# Patient Record
Sex: MALE | Race: WHITE | Employment: FULL TIME | URBAN - METROPOLITAN AREA
[De-identification: names, ages, dates, MRNs, and addresses within clinical notes are randomized per-mention and may not be internally consistent; named-entity substitution may affect disease eponyms.]

---

## 2022-03-18 ENCOUNTER — HOSPITAL ENCOUNTER (EMERGENCY)
Age: 37
Discharge: HOME | End: 2022-03-19
Attending: EMERGENCY MEDICINE
Payer: COMMERCIAL

## 2022-03-18 ENCOUNTER — APPOINTMENT (OUTPATIENT)
Dept: GENERAL RADIOLOGY | Age: 37
End: 2022-03-18
Attending: NURSE PRACTITIONER
Payer: COMMERCIAL

## 2022-03-18 VITALS
TEMPERATURE: 97.8 F | RESPIRATION RATE: 18 BRPM | SYSTOLIC BLOOD PRESSURE: 138 MMHG | HEART RATE: 64 BPM | OXYGEN SATURATION: 97 % | DIASTOLIC BLOOD PRESSURE: 79 MMHG

## 2022-03-18 DIAGNOSIS — S99.912A INJURY OF LEFT ANKLE, INITIAL ENCOUNTER: Primary | ICD-10-CM

## 2022-03-18 PROCEDURE — 99283 EMERGENCY DEPT VISIT LOW MDM: CPT

## 2022-03-18 PROCEDURE — 73610 X-RAY EXAM OF ANKLE: CPT

## 2022-03-18 PROCEDURE — 73590 X-RAY EXAM OF LOWER LEG: CPT

## 2022-03-19 NOTE — ED TRIAGE NOTES
TRIAGE NOTE:  Patient arrives ambulatory with c/o left ankle pain. Patient was playing soccer and reports heard a loud pop.

## 2022-03-19 NOTE — ED PROVIDER NOTES
HERMAN     Leida Lopez is a 39 y.o. male without any PMhx who presents ambulatory  to [facility] ED with cc of L leg pain. Patient reports that he was walking to play soccer tonight when he felt a pop in the back of his left leg. He states that he has been unable to flex and extend his ankle without pain and has tenderness to the back of his calf. No swelling present. He denies any falls or injuries. He denies any history of orthopedic surgeries recently. Denies ETOH, tobacco, substance abuse. No medication PTA for s/sx. PCP: Unknown, Provider, DPM    There are no other complaints, changes or physical findings at this time. No past medical history on file. No past surgical history on file. No family history on file. Social History     Socioeconomic History    Marital status: Not on file     Spouse name: Not on file    Number of children: Not on file    Years of education: Not on file    Highest education level: Not on file   Occupational History    Not on file   Tobacco Use    Smoking status: Not on file    Smokeless tobacco: Not on file   Substance and Sexual Activity    Alcohol use: Not on file    Drug use: Not on file    Sexual activity: Not on file   Other Topics Concern    Not on file   Social History Narrative    Not on file     Social Determinants of Health     Financial Resource Strain:     Difficulty of Paying Living Expenses: Not on file   Food Insecurity:     Worried About Running Out of Food in the Last Year: Not on file    Noemi of Food in the Last Year: Not on file   Transportation Needs:     Lack of Transportation (Medical): Not on file    Lack of Transportation (Non-Medical):  Not on file   Physical Activity:     Days of Exercise per Week: Not on file    Minutes of Exercise per Session: Not on file   Stress:     Feeling of Stress : Not on file   Social Connections:     Frequency of Communication with Friends and Family: Not on file    Frequency of Social Gatherings with Friends and Family: Not on file    Attends Hindu Services: Not on file    Active Member of Clubs or Organizations: Not on file    Attends Club or Organization Meetings: Not on file    Marital Status: Not on file   Intimate Partner Violence:     Fear of Current or Ex-Partner: Not on file    Emotionally Abused: Not on file    Physically Abused: Not on file    Sexually Abused: Not on file   Housing Stability:     Unable to Pay for Housing in the Last Year: Not on file    Number of [place] in the Last Year: Not on file    Unstable Housing in the Last Year: Not on file         ALLERGIES: Patient has no known allergies. Review of Systems   Constitutional: Negative for activity change, appetite change, chills and fever. HENT: Negative for congestion and sneezing. Eyes: Negative for visual disturbance. Respiratory: Negative for cough and shortness of breath. Cardiovascular: Negative for chest pain. Gastrointestinal: Negative for abdominal pain, diarrhea, nausea and vomiting. Genitourinary: Negative for dysuria, flank pain, frequency and urgency. Musculoskeletal: Positive for arthralgias and myalgias. Negative for back pain and neck pain. Skin: Negative for color change and rash. Neurological: Negative for weakness and light-headedness. Psychiatric/Behavioral: Negative for agitation, behavioral problems and confusion. All other systems reviewed and are negative. Vitals:    03/18/22 2237   BP: 138/79   Pulse: 64   Resp: 18   Temp: 97.8 °F (36.6 °C)   SpO2: 97%            Physical Exam  Vitals and nursing note reviewed. Constitutional:       General: He is not in acute distress. Appearance: He is well-developed. HENT:      Head: Normocephalic and atraumatic. Right Ear: External ear normal.      Left Ear: External ear normal.   Eyes:      Conjunctiva/sclera: Conjunctivae normal.      Pupils: Pupils are equal, round, and reactive to light. Cardiovascular:      Rate and Rhythm: Normal rate and regular rhythm. Heart sounds: Normal heart sounds. Pulmonary:      Effort: Pulmonary effort is normal.      Breath sounds: Normal breath sounds. Musculoskeletal:         General: No swelling (LLE ). Cervical back: Normal range of motion and neck supple. Left lower leg: Tenderness present. No swelling. No edema. Legs:    Skin:     General: Skin is warm and dry. Neurological:      Mental Status: He is alert and oriented to person, place, and time. Psychiatric:         Behavior: Behavior normal.         Thought Content: Thought content normal.         Judgment: Judgment normal.          MDM  Number of Diagnoses or Management Options  Injury of left ankle, initial encounter  Diagnosis management comments: DDx: sprain, strain, achilles tear     Patient presents with concern for popping sensation in the back of his left leg then had increased pain with difficulty with range of motion in left ankle. X-rays are without any acute or emergent findings. Discussed concern for possible Achilles injury. Patient was placed in walking boot with crutches provided. WB as tolerated, told not to exercise. He was urged to follow-up with orthopedics as soon as possible, Motrin/ Tylenol for pain, RICE. Reasons to return to ED provided. Amount and/or Complexity of Data Reviewed  Tests in the radiology section of CPT®: ordered and reviewed  Review and summarize past medical records: yes           Procedures    LABORATORY TESTS:  No results found for this or any previous visit (from the past 12 hour(s)). IMAGING RESULTS:  XR ANKLE LT MIN 3 V   Final Result   No acute abnormality. XR TIB/FIB LT   Final Result   No acute abnormality. MEDICATIONS GIVEN:  Medications - No data to display    IMPRESSION:  1. Injury of left ankle, initial encounter        PLAN:  1. There are no discharge medications for this patient.     2.   Follow-up Information     Follow up With Specialties Details Why [facility]  Schedule an appointment as soon as possible for a visit   [address] [facility] [address], [facility] [facility] Emergency Medicine Go to  As needed, If symptoms worsen [facility]  [phone]        3.  Return to ED if worse

## 2022-03-19 NOTE — DISCHARGE INSTRUCTIONS
Follow up with Orthopedics, call Monday to setup an appointment   Use crutches and weight bear as tolerated; do not exercise, run   Alternate Motrin with Tylenol for pain management   Ice the back of your leg   Return to the ER for any worsening or worrisome symptoms

## 2022-03-22 ENCOUNTER — OFFICE VISIT (OUTPATIENT)
Dept: ORTHOPEDIC SURGERY | Age: 37
End: 2022-03-22
Payer: COMMERCIAL

## 2022-03-22 VITALS — HEIGHT: 73 IN | BODY MASS INDEX: 23.86 KG/M2 | WEIGHT: 180 LBS

## 2022-03-22 DIAGNOSIS — M25.572 ACUTE LEFT ANKLE PAIN: ICD-10-CM

## 2022-03-22 DIAGNOSIS — S86.012A ACHILLES TENDON RUPTURE, LEFT, INITIAL ENCOUNTER: Primary | ICD-10-CM

## 2022-03-22 PROCEDURE — 99203 OFFICE O/P NEW LOW 30 MIN: CPT | Performed by: ORTHOPAEDIC SURGERY

## 2022-03-22 NOTE — LETTER
3/23/2022    Patient: Joie Franco   YOB: 1985   Date of Visit: 3/22/2022     Marc Jain MD  [facility] [address] [address]  Via Fax: [phone]    Dear Marc Jain MD,      Thank you for referring Mr. Marbella Atkins to [facility] for evaluation. My notes for this consultation are attached. If you have questions, please do not hesitate to call me. I look forward to following your patient along with you.       Sincerely,    Brittney Curry MD

## 2022-03-22 NOTE — PROGRESS NOTES
[address] Berger (: 1985) is a 39 y.o. male, patient,here for evaluation of the following   Chief Complaint   Patient presents with    Musculoskeletal Pain     left achilles tendon injury         ASSESSMENT/PLAN:  Below is the assessment and plan developed based on review of pertinent history, physical exam, labs, studies, and medications. 1. Achilles tendon rupture, left, initial encounter  2. Acute left ankle pain      Patient informed of treatment for Achilles tendon rupture, both nonoperative and operative treatment are discussed at length to include the risks, potential complications, expected outcomes, postop limitation and time needed for recovery. Discussed the potential complications and/or risks with nonoperative treatment is possible rerupture and may have a little bit of weakness residual after healing of the tendon, the tendon may be elongated different from contralateral however even repairs can result in elongation of tendon. If surgery is elected, the main risk is wound complications such as dehiscence, delayed healing, infection to the incision area, and anesthesia risk. Other risk include neurovascular injury. I have answered all his questions regarding both treatments. Patient is not certain which way he wants to proceed. I think either way may be okay for treatment since his tendon is not severely  but if he does elect to proceed with nonsurgical treatment I would recommend a tall boot and heel lift and eventual physical therapy program.  If surgery is elected then I would recommend small incision procedure and plan would be he follows up 2 weeks postop. He did not select a surgical date as he does not wish to proceed with surgery at this time and would like to consider nonsurgical option. If he does change his mind, we did provide a telephone number he can call as soon as possible to let us know. Otherwise follow-up in about 4 weeks for reevaluation.   No x-rays are needed next time. Return in about 4 weeks (around 4/19/2022), or if symptoms worsen or fail to improve. No Known Allergies    No current outpatient medications on file. No current facility-administered medications for this visit. No past medical history on file. No past surgical history on file. No family history on file. Social History     Socioeconomic History    Marital status:      Spouse name: Not on file    Number of children: Not on file    Years of education: Not on file    Highest education level: Not on file   Occupational History    Not on file   Tobacco Use    Smoking status: Never Smoker    Smokeless tobacco: Never Used   Substance and Sexual Activity    Alcohol use: Not Currently    Drug use: Not Currently    Sexual activity: Not on file   Other Topics Concern    Not on file   Social History Narrative    Not on file     Social Determinants of Health     Financial Resource Strain:     Difficulty of Paying Living Expenses: Not on file   Food Insecurity:     Worried About Running Out of Food in the Last Year: Not on file    Noemi of Food in the Last Year: Not on file   Transportation Needs:     Lack of Transportation (Medical): Not on file    Lack of Transportation (Non-Medical):  Not on file   Physical Activity:     Days of Exercise per Week: Not on file    Minutes of Exercise per Session: Not on file   Stress:     Feeling of Stress : Not on file   Social Connections:     Frequency of Communication with Friends and Family: Not on file    Frequency of Social Gatherings with Friends and Family: Not on file    Attends Evangelical Services: Not on file    Active Member of Clubs or Organizations: Not on file    Attends Club or Organization Meetings: Not on file    Marital Status: Not on file   Intimate Partner Violence:     Fear of Current or Ex-Partner: Not on file    Emotionally Abused: Not on file    Physically Abused: Not on file   Tahir Sexually Abused: Not on file   Housing Stability:     Unable to Pay for Housing in the Last Year: Not on file    Number of Places Lived in the Last Year: Not on file    Unstable Housing in the Last Year: Not on file           Vitals:  Ht 6' 1\" (1.854 m)   Wt 180 lb (81.6 kg)   BMI 23.75 kg/m²    Body mass index is 23.75 kg/m². SUBJECTIVE/OBJECTIVE:  Shirin Ramesh (: 1985)   New patient presents today with complaint of left foot and ankle injury after playing soccer on 2022. He felt a pop on the backside of his ankle when he stepped on something and after that injury could not run or walk properly. He states there is a bit of discomfort on the backside of his calf and he still not walking normally. Current pain is mild dull comes and goes there is some swelling and bruising and weakness sensation. Standing, stairs/steps, running walking make it worse and symptoms unchanged. He has tried rest, ice and Tylenol. He was seen at [facility] emergency room on 2022 and x-rays were obtained. He is not diabetic, non-smoker. Physical Exam  Pleasant well-nourished male , alert and oriented to person, time and place, no acute distress. Mostly nonweightbearing gait, limited weightbearing stance. Using crutches    Left lower extremity/ankle: Calves are soft and tenderness is present towards the distal part of the calf muscles, there is a mid substance Achilles tendon tear with a deficit that is not severe gap, there is a negative Arnett test noted when patient is prone with knees at 90 degrees. Left foot: Nontender at the hindfoot, midfoot and forefoot. Calcaneus nontender with negative calcaneal squeeze test.    Contralateral foot and ankle exam, nontender, no swelling ligaments grossly stable. Normal weightbearing stance.     Neurovascular exam intact for light touch sensation, cap refill, dorsalis pedis pulse palpable, flexion/extension strength 5/5.    Skin intact without open wounds, lesions or ulcers, no skin discolorations, normal warmth to skin. Imaging:     XR Results (maximum last 2): Results from [place] encounter on 03/18/22    XR TIB/FIB LT    Narrative  EXAM: XR TIB/FIB LT  History: Soccer injury  INDICATION: pain post soccer injury. COMPARISON: None. FINDINGS: AP and lateral  views of the left tibia and fibula demonstrate no  fracture or other acute osseous, articular or soft tissue abnormality. Impression  No acute abnormality. XR ANKLE LT MIN 3 V    Narrative  EXAM: XR ANKLE LT MIN 3 V  Clinical history: Injury playing soccer  INDICATION: swelling/ pain post soccer injury. COMPARISON: None. FINDINGS: Three views of the left ankle demonstrate no fracture or disruption of  the ankle mortise. There is no other acute osseous or articular abnormality. The soft tissues are within normal limits. Impression  No acute abnormality. The x-rays of left lower extremity which included a tibia and fibula and the left ankle AP, lateral and oblique all nonweightbearing x-rays are reviewed and these x-rays were obtained on March 18, 2022 at [facility] emergency room presentation. All these x-rays are reviewed on PACS and it shows no fractures or dislocations at both the tibia/fibula and ankle. The ankle mortise is normal.      An electronic signature was used to authenticate this note.   -- Joanne Estevez MD

## 2022-03-23 DIAGNOSIS — S86.012A ACHILLES TENDON RUPTURE, LEFT, INITIAL ENCOUNTER: Primary | ICD-10-CM

## 2022-03-23 DIAGNOSIS — M25.572 ACUTE LEFT ANKLE PAIN: ICD-10-CM

## 2022-04-04 DIAGNOSIS — S86.012A ACHILLES TENDON RUPTURE, LEFT, INITIAL ENCOUNTER: Primary | ICD-10-CM

## 2022-04-04 DIAGNOSIS — G89.18 ACUTE POST-OPERATIVE PAIN: ICD-10-CM

## 2022-04-04 RX ORDER — HYDROCODONE BITARTRATE AND ACETAMINOPHEN 5; 325 MG/1; MG/1
1 TABLET ORAL
Qty: 20 TABLET | Refills: 0 | Status: SHIPPED | OUTPATIENT
Start: 2022-04-04 | End: 2022-04-07

## 2022-04-04 NOTE — PROGRESS NOTES
Post op medications prescribed and sent to pharmacy at [facility].

## 2022-04-19 ENCOUNTER — OFFICE VISIT (OUTPATIENT)
Dept: ORTHOPEDIC SURGERY | Age: 37
End: 2022-04-19
Payer: COMMERCIAL

## 2022-04-19 DIAGNOSIS — Z09 SURGICAL FOLLOW-UP CARE: Primary | ICD-10-CM

## 2022-04-19 DIAGNOSIS — S86.011A ACHILLES TENDON RUPTURE, RIGHT, INITIAL ENCOUNTER: ICD-10-CM

## 2022-04-19 PROCEDURE — 99024 POSTOP FOLLOW-UP VISIT: CPT | Performed by: ORTHOPAEDIC SURGERY

## 2022-04-19 PROCEDURE — L4387 NON-PNEUM WALK BOOT PRE OTS: HCPCS | Performed by: ORTHOPAEDIC SURGERY

## 2022-04-19 NOTE — LETTER
2022    Patient: Herb Johnson   YOB: 1985   Date of Visit: 2022     Cherrie Schmidt MD  [address] [address]  Via Fax: [phone]    Dear Cherrie Schmidt MD,      Thank you for referring Mr. Elio Martins to [facility] for evaluation. My notes for this consultation are attached. If you have questions, please do not hesitate to call me. I look forward to following your patient along with you.       Sincerely,    Aleene Apgar, MD

## 2022-04-19 NOTE — PROGRESS NOTES
Bal Berger (: 1985) is a 40 y.o. male, patient,here for evaluation of the following   Chief Complaint   Patient presents with    Surgical Follow-up     Achilles tendon rupture, left        ASSESSMENT/PLAN:  Below is the assessment and plan developed based on review of pertinent history, physical exam, labs, studies, and medications. 1. Surgical follow-up care  -     REFERRAL TO DME  -     [facility] NON-PNEUM WALK BOOT PRE OTS  2. Achilles tendon rupture, right, initial encounter  -     REFERRAL TO DME  -     [place] NON-PNEUM WALK BOOT PRE OTS    Patient is first follow-up status post procedure, he is doing very well and he is placed into a tall boot with Achilles wedges. He will remain nonweightbearing until after next follow-up. He will work on gentle range of motion home exercise program for now but next time he returns we will refer to physical therapist.  He will return sooner if any problems arise. No X-rays needed next time. Return in about 3 weeks (around 5/10/2022). No Known Allergies    No current outpatient medications on file. No current facility-administered medications for this visit. No past medical history on file. No past surgical history on file. No family history on file.     Social History     Socioeconomic History    Marital status:      Spouse name: Not on file    Number of children: Not on file    Years of education: Not on file    Highest education level: Not on file   Occupational History    Not on file   Tobacco Use    Smoking status: Never Smoker    Smokeless tobacco: Never Used   Substance and Sexual Activity    Alcohol use: Not Currently    Drug use: Not Currently    Sexual activity: Not on file   Other Topics Concern    Not on file   Social History Narrative    Not on file     Social Determinants of Health     Financial Resource Strain:     Difficulty of Paying Living Expenses: Not on file   Food Insecurity:     Worried About Running Out of Food in the Last Year: Not on file    Ran Out of Food in the Last Year: Not on file   Transportation Needs:     Lack of Transportation (Medical): Not on file    Lack of Transportation (Non-Medical): Not on file   Physical Activity:     Days of Exercise per Week: Not on file    Minutes of Exercise per Session: Not on file   Stress:     Feeling of Stress : Not on file   Social Connections:     Frequency of Communication with Friends and Family: Not on file    Frequency of Social Gatherings with Friends and Family: Not on file    Attends Shinto Services: Not on file    Active Member of [address] [place] [address] [employer] or Organizations: Not on file    Attends Club or Organization Meetings: Not on file    Marital Status: Not on file   Intimate Partner Violence:     Fear of Current or Ex-Partner: Not on file    Emotionally Abused: Not on file    Physically Abused: Not on file    Sexually Abused: Not on file   Housing Stability:     Unable to Pay for Housing in the Last Year: Not on file    Number of [place] in the Last Year: Not on file    Unstable Housing in the Last Year: Not on file           Vitals: There were no vitals taken for this visit. There is no height or weight on file to calculate BMI. SUBJECTIVE/OBJECTIVE:  Valentina Thomasmin (: 1985)   Patient here for follow-up status post Achilles tendon repair for Achilles tendon rupture left lower extremity. Surgical date 2022. He is doing well he has no complaints. Denies chest pain, shortness of breath, calf pain or swelling. He is taking aspirin per day for DVT prophylaxis. Physical Exam  Pleasant well-nourished male , alert and oriented to person, time and place, no acute distress. Nonweightbearing gait, limited weightbearing stance. Left lower extremity/ankle: Calf soft nontender, the posterior incision is healing properly, no signs of infection.   When patient is in prone position with knees at 90 degrees, resting foot position similar to contralateral.  There is a negative Arnett test.    Left foot: No malalignment or deformity, nontender, no swelling, neurovascular exam grossly intact. Neurovascular exam intact for light touch sensation, cap refill, dorsalis pedis pulse palpable, flexion/extension strength 5/5. Skin intact without open wounds, lesions or ulcers, no skin discolorations, normal warmth to skin. Imaging:    No x-rays obtained. An electronic signature was used to authenticate this note.   -- Enid Matta MD

## 2022-05-17 ENCOUNTER — OFFICE VISIT (OUTPATIENT)
Dept: ORTHOPEDIC SURGERY | Age: 37
End: 2022-05-17
Payer: COMMERCIAL

## 2022-05-17 VITALS — BODY MASS INDEX: 23.19 KG/M2 | HEIGHT: 73 IN | WEIGHT: 175 LBS

## 2022-05-17 DIAGNOSIS — S86.012D ACHILLES TENDON RUPTURE, LEFT, SUBSEQUENT ENCOUNTER: ICD-10-CM

## 2022-05-17 DIAGNOSIS — Z09 SURGICAL FOLLOW-UP CARE: Primary | ICD-10-CM

## 2022-05-17 PROCEDURE — 99024 POSTOP FOLLOW-UP VISIT: CPT | Performed by: ORTHOPAEDIC SURGERY

## 2022-05-17 NOTE — PROGRESS NOTES
Bal Berger (: 1985) is a 40 y.o. male, patient,here for evaluation of the following   Chief Complaint   Patient presents with    Surgical Follow-up     Achilles tendon rupture, left repair on 22         ASSESSMENT/PLAN:  Below is the assessment and plan developed based on review of pertinent history, physical exam, labs, studies, and medications. 1. Surgical follow-up care  -     REFERRAL TO PHYSICAL THERAPY  2. Achilles tendon rupture, left, subsequent encounter  -     REFERRAL TO PHYSICAL THERAPY    Patient is doing very well, he will progress to weightbearing as tolerated in the boot and start weaning from crutches. He will remain in the boot to progress to full weightbearing using the Achilles wedges and every week he is using only the boot with Achilles wedges, he can take 1 wedged out at a time until he is down to no wedges. He will do this over the next 4 to 6 weeks. He is referred to physical therapy program to start and referral is provided today. No x-rays are needed at next follow-up. Return in about 6 weeks (around 2022). No Known Allergies    No current outpatient medications on file. No current facility-administered medications for this visit. No past medical history on file. No past surgical history on file. No family history on file.     Social History     Socioeconomic History    Marital status:      Spouse name: Not on file    Number of children: Not on file    Years of education: Not on file    Highest education level: Not on file   Occupational History    Not on file   Tobacco Use    Smoking status: Never Smoker    Smokeless tobacco: Never Used   Substance and Sexual Activity    Alcohol use: Not Currently    Drug use: Not Currently    Sexual activity: Not on file   Other Topics Concern    Not on file   Social History Narrative    Not on file     Social Determinants of Health     Financial Resource Strain:    Tahir Difficulty of Paying Living Expenses: Not on file   Food Insecurity:     Worried About Running Out of Food in the Last Year: Not on file    Ran Out of Food in the Last Year: Not on file   Transportation Needs:     Lack of Transportation (Medical): Not on file    Lack of Transportation (Non-Medical): Not on file   Physical Activity:     Days of Exercise per Week: Not on file    Minutes of Exercise per Session: Not on file   Stress:     Feeling of Stress : Not on file   Social Connections:     Frequency of Communication with Friends and Family: Not on file    Frequency of Social Gatherings with Friends and Family: Not on file    Attends Baptist Services: Not on file    Active Member of [address] [place] [address] or Organizations: Not on file    Attends Club or Organization Meetings: Not on file    Marital Status: Not on file   Intimate Partner Violence:     Fear of Current or Ex-Partner: Not on file    Emotionally Abused: Not on file    Physically Abused: Not on file    Sexually Abused: Not on file   Housing Stability:     Unable to Pay for Housing in the Last Year: Not on file    Number of [place] in the Last Year: Not on file    Unstable Housing in the Last Year: Not on file           Vitals:  Ht 6' 1\" (1.854 m)   Wt 175 lb (79.4 kg)   BMI 23.09 kg/m²    Body mass index is 23.09 kg/m². SUBJECTIVE/OBJECTIVE:  Seth Mccain (: 1985)   Patient back for reevaluation of the left Achilles tendon rupture lower extremity and is status post care on 2022. He has no complaints, states doing well. Physical Exam  Pleasant well-nourished male , alert and oriented to person, time and place, no acute distress. Mild antalgic gait still using boot and crutches, limited weightbearing stance.     Left lower extremity/ankle: Posterior incision is healing very well, no signs of infection, when patient prone with knees at 90 degrees, resting foot position is similar to contralateral and there is a negative Arnett test.  Calves are soft nontender. Plantar flexion strength is improved at 4/5. Left foot: Nontender, no swelling, plantarflexion strength 5/5 great toe and toes passive and active. Contralateral foot and ankle exam, nontender, no swelling ligaments grossly stable. Normal weightbearing stance. Neurovascular exam intact for light touch sensation, cap refill, dorsalis pedis pulse palpable, flexion/extension strength 5/5. Skin intact without open wounds, lesions or ulcers, no skin discolorations, normal warmth to skin. Imaging:    No x-rays obtained. An electronic signature was used to authenticate this note.   -- Lani Sahu MD

## 2022-05-17 NOTE — LETTER
5/17/2022    Patient: Yesi Greenfield   YOB: 1985   Date of Visit: 5/17/2022     Stanton Holden MD  [address] [place] [address] [address] [facility] [address]  Via Fax: [phone]    Dear Stanton Holden MD,      Thank you for referring Mr. Dhruv Badillo to [facility] for evaluation. My notes for this consultation are attached. If you have questions, please do not hesitate to call me. I look forward to following your patient along with you.       Sincerely,    Harvey Jason MD

## 2022-06-28 ENCOUNTER — OFFICE VISIT (OUTPATIENT)
Dept: ORTHOPEDIC SURGERY | Age: 37
End: 2022-06-28
Payer: COMMERCIAL

## 2022-06-28 VITALS — BODY MASS INDEX: 23.19 KG/M2 | WEIGHT: 175 LBS | HEIGHT: 73 IN

## 2022-06-28 DIAGNOSIS — Z09 SURGICAL FOLLOW-UP CARE: Primary | ICD-10-CM

## 2022-06-28 DIAGNOSIS — S86.012D ACHILLES TENDON RUPTURE, LEFT, SUBSEQUENT ENCOUNTER: ICD-10-CM

## 2022-06-28 PROCEDURE — 99024 POSTOP FOLLOW-UP VISIT: CPT | Performed by: ORTHOPAEDIC SURGERY

## 2022-06-28 NOTE — LETTER
6/29/2022    Patient: Israel Drake   YOB: 1985   Date of Visit: 6/28/2022     Colten Rondon MD  [address] [address]  Via Fax: [phone]    Dear Colten Rondon MD,      Thank you for referring Mr. Volodymyr Zafar to [facility] for evaluation. My notes for this consultation are attached. If you have questions, please do not hesitate to call me. I look forward to following your patient along with you.       Sincerely,    Remington De Leon MD

## 2022-06-28 NOTE — PROGRESS NOTES
Bal Berger (: 1985) is a 40 y.o. male, patient,here for evaluation of the following   Chief Complaint   Patient presents with    Foot Pain     post op follow up        ASSESSMENT/PLAN:  Below is the assessment and plan developed based on review of pertinent history, physical exam, labs, studies, and medications. 1. Surgical follow-up care  2. Achilles tendon rupture, left, subsequent encounter    Patient is doing very well, he will complete physical therapy program as previously prescribed and will progress to full weightbearing without boot and gradual return to activities as tolerated. I do not recommend high impact activities for the next 6 months. If any problems arise, he needs to return as soon as possible. Otherwise he can schedule for 3 months follow-up and will return if there is still little bit of symptoms present, if he is doing very well he can cancel that appointment. He agrees with this plan. Return in about 3 months (around 2022), or if symptoms worsen or fail to improve. No Known Allergies    No current outpatient medications on file. No current facility-administered medications for this visit. History reviewed. No pertinent past medical history. History reviewed. No pertinent surgical history. History reviewed. No pertinent family history.     Social History     Socioeconomic History    Marital status:      Spouse name: Not on file    Number of children: Not on file    Years of education: Not on file    Highest education level: Not on file   Occupational History    Not on file   Tobacco Use    Smoking status: Never Smoker    Smokeless tobacco: Never Used   Substance and Sexual Activity    Alcohol use: Not Currently    Drug use: Not Currently    Sexual activity: Not on file   Other Topics Concern    Not on file   Social History Narrative    Not on file     Social Determinants of Health     Financial Resource Strain:    [facility] Difficulty of Paying Living Expenses: Not on file   Food Insecurity:     Worried About Running Out of Food in the Last Year: Not on file    Ran Out of Food in the Last Year: Not on file   Transportation Needs:     Lack of Transportation (Medical): Not on file    Lack of Transportation (Non-Medical): Not on file   Physical Activity:     Days of Exercise per Week: Not on file    Minutes of Exercise per Session: Not on file   Stress:     Feeling of Stress : Not on file   Social Connections:     Frequency of Communication with Friends and Family: Not on file    Frequency of Social Gatherings with Friends and Family: Not on file    Attends [employer] Services: Not on file    Active Member of [address] [place] [address] [employer] or Organizations: Not on file    Attends Club or Organization Meetings: Not on file    Marital Status: Not on file   Intimate Partner Violence:     Fear of Current or Ex-Partner: Not on file    Emotionally Abused: Not on file    Physically Abused: Not on file    Sexually Abused: Not on file   Housing Stability:     Unable to Pay for Housing in the Last Year: Not on file    Number of [place] in the Last Year: Not on file    Unstable Housing in the Last Year: Not on file           Vitals:  Ht 6' 1\" (1.854 m)   Wt 175 lb (79.4 kg)   BMI 23.09 kg/m²    Body mass index is 23.09 kg/m². SUBJECTIVE/OBJECTIVE:  Anthony Jasmine (: 1985)   Patient is back for reevaluation of the left Achilles tendon rupture status post surgical repair with pars technique, surgery was done on 2022 and the injury was around the end of 2022. He has been going to physical therapy program, he states he is doing well and he has no complaints. He is however still wearing a boot but is fully weightbearing in the boot. Physical Exam  Pleasant well-nourished male , alert and oriented to person, time and place, no acute distress. Mostly normal gait, satisfactory weightbearing stance.     Left lower extremity/ankle: There is a well-healed incision, when patient prone with knees at 90 degrees, resting foot position similar to contralateral, there is a negative Arnett test.  Plantarflexion strength improved to 4+/5 compared to 5/5 contralateral.  No signs of infection, all looks good. Calf soft nontender. There is still slight atrophy of the calf muscles compared to contralateral calf. Left foot: No tenderness to palpation, range of motion intact. Neurovascular exam intact for light touch sensation, cap refill, dorsalis pedis pulse palpable, flexion/extension strength 5/5. Skin intact without open wounds, lesions or ulcers, no skin discolorations, normal warmth to skin. Imaging:    No x-rays were obtained. An electronic signature was used to authenticate this note.   -- Jonathan Shah MD